# Patient Record
Sex: FEMALE | Race: WHITE | NOT HISPANIC OR LATINO | Employment: PART TIME | URBAN - METROPOLITAN AREA
[De-identification: names, ages, dates, MRNs, and addresses within clinical notes are randomized per-mention and may not be internally consistent; named-entity substitution may affect disease eponyms.]

---

## 2021-07-24 ENCOUNTER — APPOINTMENT (OUTPATIENT)
Dept: CT IMAGING | Facility: HOSPITAL | Age: 21
End: 2021-07-24

## 2021-07-24 ENCOUNTER — APPOINTMENT (OUTPATIENT)
Dept: GENERAL RADIOLOGY | Facility: HOSPITAL | Age: 21
End: 2021-07-24

## 2021-07-24 ENCOUNTER — HOSPITAL ENCOUNTER (EMERGENCY)
Facility: HOSPITAL | Age: 21
Discharge: HOME | End: 2021-07-24
Admitting: EMERGENCY MEDICINE

## 2021-07-24 VITALS
RESPIRATION RATE: 13 BRPM | OXYGEN SATURATION: 99 % | HEART RATE: 73 BPM | TEMPERATURE: 98.1 F | BODY MASS INDEX: 36.64 KG/M2 | WEIGHT: 228 LBS | HEIGHT: 66 IN | DIASTOLIC BLOOD PRESSURE: 72 MMHG | SYSTOLIC BLOOD PRESSURE: 120 MMHG

## 2021-07-24 DIAGNOSIS — S86.912A STRAIN OF LEFT KNEE, INITIAL ENCOUNTER: ICD-10-CM

## 2021-07-24 DIAGNOSIS — S00.03XA CONTUSION OF SCALP, INITIAL ENCOUNTER: ICD-10-CM
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
## 2021-07-24 DIAGNOSIS — M25.562 ACUTE PAIN OF LEFT KNEE: ICD-10-CM

## 2021-07-24 PROCEDURE — 70450 CT HEAD/BRAIN W/O DYE: CPT

## 2021-07-24 PROCEDURE — 99284 EMERGENCY DEPT VISIT MOD MDM: CPT

## 2021-07-24 PROCEDURE — 73562 X-RAY EXAM OF KNEE 3: CPT

## 2021-07-24 RX ORDER — NAPROXEN 500 MG/1
500 TABLET ORAL 2 TIMES DAILY WITH MEALS
Qty: 20 TABLET | Refills: 0 | Status: SHIPPED | OUTPATIENT
Start: 2021-07-24 | End: 2021-10-07

## 2021-07-24 RX ORDER — HYDROCODONE BITARTRATE AND ACETAMINOPHEN 5; 325 MG/1; MG/1
1 TABLET ORAL ONCE AS NEEDED
Status: COMPLETED | OUTPATIENT
Start: 2021-07-24 | End: 2021-07-24

## 2021-07-24 RX ADMIN — HYDROCODONE BITARTRATE AND ACETAMINOPHEN 1 TABLET: 5; 325 TABLET ORAL at 08:22

## 2021-07-24 NOTE — DISCHARGE INSTRUCTIONS
Use knee sleeve to the left knee for the next 5-7 days; perform range of motion exercises 3-4 times daily as instructed; apply ice for 20 minutes at a time for the next 48 hours to reduce swelling; use crutches for non weight-bearing to the left knee for the next 3-5 days, gradually begin weight bearing as tolerated by pain. Return for worsening symptoms including increased pain, swelling, discoloration, or coldness of a extremity.    Get head injury instructions; keep head elevated, apply cool compresses as tolerated; activity as tolerated, avoid large meals or excessive fluid intake for the next 12 hours; follow up with your primary care physician in office for continued evaluation; Tylenol as needed for pain; return to ED for any new concerns or changes including behavioral cognition changes, imbalance, vomiting, any increased swelling, inability of eyes to focus or other changes.    Take naproxen or Tylenol as needed for pain.  Do not mix naproxen with other NSAID such as ibuprofen diclofenac or Aleve.    Return to ED for any new or worsening symptoms    Follow-up with your primary care provider in 3-5 days.  If you do not have a primary care provider call [phone] for help in finding one, or you may follow up with [facility] at [phone].

## 2021-07-24 NOTE — ED PROVIDER NOTES
"Subjective   Patient is a 20-year-old female who presents with complaints of left knee pain and headache after she slipped and fell at work just prior to arrival to the ED.  Patient states that her patella did dislocate and reduced on its one.  Patient states that this is a recurrent issue she did have surgery when she was 16 to trying to fix her recurrent dislocation but it was unsuccessful.  Patient reports pain along the anterior aspect of her knee sharp throbbing type pain is nonradiating from the area worse with bending and ambulation better with rest.  She currently rates a 9/10 severity.  She was not sure if she hit her head or not but states she has \"a goose egg on the left side of my head\" and is now having a headache denies thunderclap or worst take of her life no one-sided numbness weakness slurred speech or facial droop.  No visual disturbances lightheadedness or dizziness.  Patient is not on any blood thinners.  Patient denies any chest pain shortness of breath no nausea vomiting or abdominal pain.  Patient states he is taken no medications for symptoms.  Denies any other alleviating or exacerbating factors.      History provided by:  Patient      Review of Systems   Constitutional: Negative.    Eyes: Negative for photophobia and visual disturbance.   Respiratory: Negative.    Cardiovascular: Negative.    Gastrointestinal: Negative.    Genitourinary: Negative.    Musculoskeletal: Positive for arthralgias. Negative for neck pain and neck stiffness.   Skin: Negative.    Neurological: Positive for headaches. Negative for dizziness, tremors, seizures, syncope, facial asymmetry, speech difficulty, weakness, light-headedness and numbness.   Hematological: Negative.        History reviewed. No pertinent past medical history.    Allergies   Allergen Reactions   • Benadryl [Diphenhydramine] Rash       History reviewed. No pertinent surgical history.    History reviewed. No pertinent family history.    Social " History     Socioeconomic History   • Marital status: Significant Other     Spouse name: Not on file   • Number of children: Not on file   • Years of education: Not on file   • Highest education level: Not on file           Objective   Physical Exam  Vitals and nursing note reviewed.   Constitutional:       General: She is not in acute distress.     Appearance: She is well-developed. She is not ill-appearing, toxic-appearing or diaphoretic.   HENT:      Head: Normocephalic.        Mouth/Throat:      Mouth: Mucous membranes are moist.      Pharynx: Oropharynx is clear.   Eyes:      General: No scleral icterus.     Extraocular Movements: Extraocular movements intact.      Pupils: Pupils are equal, round, and reactive to light.   Cardiovascular:      Rate and Rhythm: Normal rate and regular rhythm.      Pulses: Normal pulses.      Heart sounds: No murmur heard.   No friction rub. No gallop.    Pulmonary:      Effort: Pulmonary effort is normal. No respiratory distress.      Breath sounds: Normal breath sounds. No stridor. No wheezing, rhonchi or rales.   Chest:      Chest wall: No swelling, tenderness, crepitus or edema.   Abdominal:      General: Bowel sounds are normal. There is no distension.      Palpations: Abdomen is soft. There is no mass.      Tenderness: There is no abdominal tenderness. There is no right CVA tenderness, left CVA tenderness, guarding or rebound.      Hernia: No hernia is present.   Musculoskeletal:      Cervical back: Normal range of motion and neck supple.      Left upper leg: Normal.      Right knee: Normal.      Left knee: Decreased range of motion. Tenderness present.      Left lower leg: Normal.      Left ankle: Normal.      Left Achilles Tendon: Normal.      Left foot: Normal.      Comments: Left knee: Decreased range motion with flexion extension of left knee secondary to pain.  Tenderness along the anterior aspect.  There is no overlying erythema edema or ecchymosis noted.  Old  "Surgical scar present no dehiscing or drainage noted.  Negative anterior posterior drawer test normal joint laxity with varus valgus stress.  Peripheral pulses are intact compartments are soft    Negative Nava test negative Homans' sign of left lower extremity.   Skin:     General: Skin is warm.      Capillary Refill: Capillary refill takes less than 2 seconds.      Coloration: Skin is not cyanotic, jaundiced or pale.      Findings: No rash.   Neurological:      General: No focal deficit present.      Mental Status: She is alert and oriented to person, place, and time.   Psychiatric:         Mood and Affect: Mood normal.         Behavior: Behavior normal.         Procedures           ED Course    /72 (BP Location: Right arm, Patient Position: Sitting)   Pulse 73   Temp 98.1 °F (36.7 °C) (Oral)   Resp 13   Ht 167.6 cm (66\")   Wt 103 kg (228 lb)   SpO2 99%   BMI 36.80 kg/m²   Medications   HYDROcodone-acetaminophen (NORCO) 5-325 MG per tablet 1 tablet (1 tablet Oral Given 7/24/21 0822)     Labs Reviewed - No data to display  XR Knee 3 View Left    Result Date: 7/24/2021  1.An acute osseous abnormality of the knee is not definitely identified. The need for any additional workup of this area should be based on clinical findings.  Electronically Signed By-Ryan Floyd MD On:7/24/2021 8:39 AM This report was finalized on [id number] by  Ryan Floyd MD.    CT Head Without Contrast    Result Date: 7/24/2021  1.An acute intracranial abnormality is not apparent.  Electronically Signed By-Ryan Floyd MD On:7/24/2021 10:11 AM This report was finalized on [id number] by  Ryan Floyd MD.                                           MDM  Number of Diagnoses or Management Options  Acute pain of left knee  Contusion of scalp, initial encounter  Fall, initial encounter  Strain of left knee, initial encounter  Diagnosis management comments: Chart Review:  Comorbidity: As per past medical history  Labs: Not " warranted  Imaging: Was interpreted by physician and reviewed by myself:  XR Knee 3 View Left  Result Date: 7/24/2021  1.An acute osseous abnormality of the knee is not definitely identified. The need for any additional workup of this area should be based on clinical findings.  Electronically Signed By-Ryan Floyd MD On:7/24/2021 8:39 AM This report was finalized on [id number] by  Ryan Floyd MD.    CT Head Without Contrast  Result Date: 7/24/2021  1.An acute intracranial abnormality is not apparent.  Electronically Signed By-Ryan Floyd MD On:7/24/2021 10:11 AM This report was finalized on [id number] by  Ryan Floyd MD.    Disposition/Treatment:  Appropriate PPE was worn during exam and throughout all encounters with the patient.  While in the ED patient was afebrile and appeared nontoxic showed no acute cranial focal neural deficits.  X-ray of left knee showed no acute osseous abnormalities as above.  CT of head showed no acute intracranial abnormalities as above.  Patient was given head injury instructions.  Patient was also given a knee sleeve she states she does have crutches at home she was advised use for the next 3 to 5 days.  She was also advised to follow-up with orthopedist for further evaluation management of her knee pain.  She will be given naproxen for home along with a work note.  findings were discussed with the patient and family at bedside who voiced understanding of discharge instructions along with signs and symptoms requiring return to the ED.  Upon discharged patient was in stable condition with followup for a revaluation.        Amount and/or Complexity of Data Reviewed  Tests in the radiology section of CPT®: reviewed        Final diagnoses:   Fall, initial encounter   Acute pain of left knee   Strain of left knee, initial encounter   Contusion of scalp, initial encounter       ED Disposition  ED Disposition     ED Disposition Condition Comment    Discharge Stable            [facility] EMERGENCY DEPARTMENT  [address] [facility] [address] [phone]  Go to   If symptoms worsen    PATIENT CONNECTION - [place] [id number]  [phone]  Call   If you do not have a primary care provider    Edward Esparza MD  [facility] [place] [address] [phone]    Schedule an appointment as soon as possible for a visit            Medication List      No changes were made to your prescriptions during this visit.          Tal Mesa PA  07/24/21 1053

## 2021-10-07 ENCOUNTER — APPOINTMENT (OUTPATIENT)
Dept: ULTRASOUND IMAGING | Facility: HOSPITAL | Age: 21
End: 2021-10-07

## 2021-10-07 ENCOUNTER — HOSPITAL ENCOUNTER (EMERGENCY)
Facility: HOSPITAL | Age: 21
Discharge: HOME | End: 2021-10-07
Attending: EMERGENCY MEDICINE | Admitting: EMERGENCY MEDICINE

## 2021-10-07 VITALS
DIASTOLIC BLOOD PRESSURE: 83 MMHG | HEIGHT: 66 IN | HEART RATE: 82 BPM | SYSTOLIC BLOOD PRESSURE: 133 MMHG | OXYGEN SATURATION: 98 % | TEMPERATURE: 97.7 F | WEIGHT: 230.82 LBS | RESPIRATION RATE: 20 BRPM | BODY MASS INDEX: 37.1 KG/M2

## 2021-10-07 DIAGNOSIS — O20.0 THREATENED ABORTION: Primary | ICD-10-CM

## 2021-10-07 LAB
ABO GROUP BLD: NORMAL
BLD GP AB SCN SERPL QL: NEGATIVE
HCG INTACT+B SERPL-ACNC: 13.46 MIU/ML
HOLD SPECIMEN: NORMAL
NUMBER OF DOSES: NORMAL
RH BLD: NEGATIVE

## 2021-10-07 PROCEDURE — 86901 BLOOD TYPING SEROLOGIC RH(D): CPT | Performed by: EMERGENCY MEDICINE

## 2021-10-07 PROCEDURE — 25010000002 RHO D IMMUNE GLOBULIN 1500 UNIT/2ML SOLUTION PREFILLED SYRINGE: Performed by: EMERGENCY MEDICINE

## 2021-10-07 PROCEDURE — 93976 VASCULAR STUDY: CPT

## 2021-10-07 PROCEDURE — 86850 RBC ANTIBODY SCREEN: CPT | Performed by: EMERGENCY MEDICINE

## 2021-10-07 PROCEDURE — 99284 EMERGENCY DEPT VISIT MOD MDM: CPT

## 2021-10-07 PROCEDURE — 84702 CHORIONIC GONADOTROPIN TEST: CPT | Performed by: EMERGENCY MEDICINE

## 2021-10-07 PROCEDURE — 76801 OB US < 14 WKS SINGLE FETUS: CPT

## 2021-10-07 PROCEDURE — 76817 TRANSVAGINAL US OBSTETRIC: CPT

## 2021-10-07 PROCEDURE — 86900 BLOOD TYPING SEROLOGIC ABO: CPT | Performed by: EMERGENCY MEDICINE

## 2021-10-07 PROCEDURE — 96372 THER/PROPH/DIAG INJ SC/IM: CPT

## 2021-10-07 RX ORDER — ACETAMINOPHEN 500 MG
1000 TABLET ORAL ONCE
Status: COMPLETED | OUTPATIENT
Start: 2021-10-07 | End: 2021-10-07

## 2021-10-07 RX ORDER — PRENATAL VIT NO.126/IRON/FOLIC 28MG-0.8MG
TABLET ORAL DAILY
COMMUNITY

## 2021-10-07 RX ADMIN — HUMAN RHO(D) IMMUNE GLOBULIN 300 MCG: 1500 SOLUTION INTRAMUSCULAR; INTRAVENOUS at 14:29

## 2021-10-07 RX ADMIN — ACETAMINOPHEN 1000 MG: 500 TABLET, FILM COATED ORAL at 12:12

## 2021-10-07 NOTE — ED NOTES
Spoke to patient about giving information to patients gma, patient said not to because she is receiving information from the patient.      Haley Brooks RN  10/07/21 2503

## 2021-10-07 NOTE — ED PROVIDER NOTES
Subjective   Chief complaint: Vaginal bleeding    21-year-old female presents with vaginal bleeding.  Patient states symptoms started this morning.  She states she is currently about 4 to 5 weeks pregnant.  She states she has taken 6 home pregnancy tests that have all been positive.  She has not been to the OB/GYN.  She has had some mild pelvic cramping this morning.  She denies any alleviating or exacerbating factors.  She states the bleeding started as some pink spotting but has gotten somewhat heavier.  She states it is still not as much as a normal period.      History provided by:  Patient      Review of Systems   Constitutional: Negative for fever.   HENT: Negative for congestion and sore throat.    Eyes: Negative for redness.   Respiratory: Negative for cough and shortness of breath.    Cardiovascular: Negative for chest pain.   Gastrointestinal: Negative for abdominal pain, diarrhea and vomiting.   Genitourinary: Positive for vaginal bleeding. Negative for dysuria.   Musculoskeletal: Negative for back pain.   Skin: Negative for rash.   Neurological: Negative for dizziness and headaches.   Psychiatric/Behavioral: Negative for confusion.       Past Medical History:   Diagnosis Date   • PCOS (polycystic ovarian syndrome)        Allergies   Allergen Reactions   • Benadryl [Diphenhydramine] Rash       Past Surgical History:   Procedure Laterality Date   • KNEE SURGERY     • TONSILLECTOMY         History reviewed. No pertinent family history.    Social History     Socioeconomic History   • Marital status: Significant Other     Spouse name: Not on file   • Number of children: Not on file   • Years of education: Not on file   • Highest education level: Not on file   Tobacco Use   • Smoking status: Never Smoker   • Smokeless tobacco: Never Used   Substance and Sexual Activity   • Alcohol use: Never       /83 (Patient Position: Sitting)   Pulse 82   Temp 97.7 °F (36.5 °C) (Temporal)   Resp 20   Ht 167.6 cm  "(66\")   Wt 105 kg (230 lb 13.2 oz)   LMP 2021   SpO2 98%   Breastfeeding No   BMI 37.26 kg/m²       Objective   Physical Exam  Constitutional:       Appearance: She is well-developed. She is obese.   HENT:      Head: Normocephalic and atraumatic.   Eyes:      Pupils: Pupils are equal, round, and reactive to light.   Cardiovascular:      Rate and Rhythm: Normal rate and regular rhythm.      Heart sounds: Normal heart sounds.   Pulmonary:      Effort: Pulmonary effort is normal. No respiratory distress.      Breath sounds: Normal breath sounds.   Abdominal:      General: Bowel sounds are normal.      Palpations: Abdomen is soft.      Tenderness: There is no abdominal tenderness.   Genitourinary:     Comments: External exam normal, speculum exam shows moderate amount of dark red blood in the vaginal vault no other discharge present.  Bimanual exam shows no cervical motion tenderness or adnexal tenderness.  Cervical os is closed.  Musculoskeletal:         General: Normal range of motion.      Cervical back: Normal range of motion and neck supple.   Skin:     General: Skin is warm and dry.   Neurological:      General: No focal deficit present.      Mental Status: She is alert and oriented to person, place, and time.         Procedures           ED Course      Results for orders placed or performed during the hospital encounter of 10/07/21   hCG, Quantitative, Pregnancy    Specimen: Blood   Result Value Ref Range    HCG Quantitative 13.46 mIU/mL    RhIg Evaluation    Specimen: Blood   Result Value Ref Range    ABO Type O     RH type Negative    Doses of Rh Immune Globulin    Specimen: Blood   Result Value Ref Range    Number of Doses Recommend 1 vial of RhIg    Antibody Screen    Specimen: Blood   Result Value Ref Range    Antibody Screen Negative    Green Top (Gel)   Result Value Ref Range    Extra Tube Hold for add-ons.      US Ob < 14 Weeks Single or First Gestation    Result Date: " 10/7/2021  Normal pelvic ultrasound. No sonographic evidence of an intrauterine pregnancy. Correlation with serial beta-hCG levels is needed.  Electronically Signed By-Perico Brooks DO On:10/7/2021 1:00 PM This report was finalized on [id number] by  Perico Brooks DO.    US Ob Transvaginal    Result Date: 10/7/2021  Normal pelvic ultrasound. No sonographic evidence of an intrauterine pregnancy. Correlation with serial beta-hCG levels is needed.  Electronically Signed By-Perico Brooks DO On:10/7/2021 1:00 PM This report was finalized on [id number] by  Perico Brooks DO.                                         MDM   Patient had the above evaluation.  Results were discussed with the patient.  Serum hCG was found to be 13.46.  Pregnancy ultrasound shows no evidence of intrauterine pregnancy.  I discussed with the patient that this is likely a miscarriage although the results could be due to a very early pregnancy.  She will follow-up with her OB/GYN.  Patient is Rh- and will be given a dose of RhoGam in the emergency room before discharge.      Final diagnoses:   Threatened        ED Disposition  ED Disposition     ED Disposition Condition Comment    Discharge Stable           No follow-up provider specified.       Medication List      No changes were made to your prescriptions during this visit.          Shiva Montelongo MD  10/07/21 3420

## 2021-10-07 NOTE — DISCHARGE INSTRUCTIONS
Follow-up with your OB/GYN.  Return to the emergency room for any new or worsening symptoms or if you have any other questions or concerns.   GENERAL APPEARANCE: - alert, in no acute distress, well developed, well nourished;   HEAD: - normocephalic, atraumatic;   EYES: - sclera anicteric bilaterally;   ORAL CAVITY: - mucosa moist, MP 2;   THROAT: - clear;   NECK/THYROID: - neck supple, full range of motion, no cervical lymphadenopathy,  no thyromegaly, trachea midline;   SKIN: - warm and dry, no spider angiomata, palmar erythema or icterus;   HEART: - no murmurs, regular rate and rhythm, S1, S2 normal;   LUNGS: - clear to auscultation bilaterally, good air movement, no wheezes, rales, rhonchi;   ABDOMEN: - bowel sounds present, no masses palpable, no organomegaly , no rebound tenderness, soft, nontender, nondistended;   MUSCULOSKELETAL: - normal posture, normal gait and station, no decreased range of motion;   EXTREMITIES: - no clubbing, cyanosis, or edema;   PSYCH: - cooperative with exam, mood/affect full range;

## 2022-09-01 ENCOUNTER — TRANSCRIBE ORDERS (OUTPATIENT)
Dept: ADMINISTRATIVE | Facility: HOSPITAL | Age: 22
End: 2022-09-01

## 2022-09-01 DIAGNOSIS — N64.52 NIPPLE DISCHARGE: Primary | ICD-10-CM
